# Patient Record
(demographics unavailable — no encounter records)

---

## 2024-11-01 NOTE — ASSESSMENT
[FreeTextEntry1] : Regarding the reconstruction after skin cancer  surgery, we discussed scarring, risk of repeat procedure, poor wound healing, need for additional revisionary surgery,asymmetry, dissatisfaction with the outcome, and unplanned surgery in the future.  All questions were answered.  All risks were well understood by the patient.  Regarding the reconstruction after Mohs surgery, we discussed scarring, risk of repeat procedure, poor wound healing, need for additional revisionary surgery,asymmetry, dissatisfaction with the outcome, and unplanned surgery in the future.  All questions were answered.  All risks were well understood by the patient.  may need 1 or two stages  Photos taken with patient permission.  miguelina Caruso  on xarelto for pacemaker/defribrillator has held xarelto in past for colonoscopy--will do same for this proceure as long as [facility]/cards tteam

## 2024-11-01 NOTE — HISTORY OF PRESENT ILLNESS
[FreeTextEntry1] : 71 yr old male  here for preop Mohs visit to right inferior eyelid--bx by Peter--BCC 9/2024  prior left nasal BCC cared for by [facility] approx 1-2 yrs prior  nonsmoker nondiabetic

## 2024-11-01 NOTE — PHYSICAL EXAM
[NI] : Normal [de-identified] : right lower eyelid approx 3mm below ciliary margin well healing bx site  3mm x 9mm   diminished lower lid tone B/L [de-identified] : as above

## 2024-12-18 NOTE — HISTORY OF PRESENT ILLNESS
[FreeTextEntry1] : 71 yr old male, here for preop Mohs visit to right inferior eyelid--bx by Peter--BCC 9/2024. Prior left nasal BCC cared for by [facility] approx 1-2 yrs prior  nonsmoker nondiabetic  Interval hx (12/18/24): Pt POD#7 s/p R lower eyelid reconstruction. Patient has no complaints at this time. Wants to know when he can get back to exercising. Denies fever, N/V or chills

## 2024-12-18 NOTE — ASSESSMENT
[FreeTextEntry1] : Regarding the reconstruction after skin cancer  surgery, we discussed scarring, risk of repeat procedure, poor wound healing, need for additional revisionary surgery,asymmetry, dissatisfaction with the outcome, and unplanned surgery in the future.  All questions were answered.  All risks were well understood by the patient.  Regarding the reconstruction after Mohs surgery, we discussed scarring, risk of repeat procedure, poor wound healing, need for additional revisionary surgery,asymmetry, dissatisfaction with the outcome, and unplanned surgery in the future.  All questions were answered.  All risks were well understood by the patient.  may need 1 or two stages  POD#7 s/p R lower eyelid reconstruction.  - removed sutures, applied steri strip  - once steri strip falls off, can apply bacitracin - Can start light exercise but NO swimming - no submerging face in water - f/u 1 week

## 2024-12-18 NOTE — PHYSICAL EXAM
[NI] : Normal [de-identified] : right lower eyelid incision healing well with prolenes in place, no drainage, no swelling, no erythema [de-identified] : as above

## 2024-12-26 NOTE — ASSESSMENT
[FreeTextEntry1] : Regarding the reconstruction after skin cancer  surgery, we discussed scarring, risk of repeat procedure, poor wound healing, need for additional revisionary surgery,asymmetry, dissatisfaction with the outcome, and unplanned surgery in the future.  All questions were answered.  All risks were well understood by the patient.  Regarding the reconstruction after Mohs surgery, we discussed scarring, risk of repeat procedure, poor wound healing, need for additional revisionary surgery,asymmetry, dissatisfaction with the outcome, and unplanned surgery in the future.  All questions were answered.  All risks were well understood by the patient.  may need 1 or two stages  Photos taken with patient permission.  miguelina Caruso  on xarelto for pacemaker/defribrillator has held xarelto in past for colonoscopy--will do same for this proceure as long as ok w medical/cards tteam  12/26/2024 s/p RL eyelid wound lcosure w LTR 12/11/2024 doing fine no issue  w right eye  pt very happy no keratoconjunctivitis  Photos taken with patient permission. no PRS issues  f/u 3 months

## 2024-12-26 NOTE — PHYSICAL EXAM
[NI] : Normal [de-identified] : right lower eyelid approx 3mm below ciliary margin well healing bx site  3mm x 9mm   diminished lower lid tone B/L [de-identified] : as above

## 2025-04-03 NOTE — PHYSICAL EXAM
[NI] : Normal [de-identified] : right lower eyelid approx 3mm below ciliary margin well healing bx site  3mm x 9mm   diminished lower lid tone B/L [de-identified] : as above

## 2025-04-03 NOTE — ASSESSMENT
[FreeTextEntry1] : Regarding the reconstruction after skin cancer  surgery, we discussed scarring, risk of repeat procedure, poor wound healing, need for additional revisionary surgery,asymmetry, dissatisfaction with the outcome, and unplanned surgery in the future.  All questions were answered.  All risks were well understood by the patient.  Regarding the reconstruction after Mohs surgery, we discussed scarring, risk of repeat procedure, poor wound healing, need for additional revisionary surgery,asymmetry, dissatisfaction with the outcome, and unplanned surgery in the future.  All questions were answered.  All risks were well understood by the patient.  may need 1 or two stages  Photos taken with patient permission.  miguelina w [facility]  on xarelto for pacemaker/defribrillator has held xarelto in past for colonoscopy--will do same for this proceure as long as ok w medical/cards tteam  12/26/2024 s/p RL eyelid wound lcosure w LTR 12/11/2024 doing fine no issue  w right eye  pt very happy no keratoconjunctivitis  Photos taken with patient permission. no PRS issues  f/u 3 months  4/3/2025 recent hospitalization at [facility] for syncope--was in saunas and steam baths (has pacemaker) spent 8 days in hospital  s/p Rt lower eyelid recon 12/2024 wound fine fine very happy a results, as am I no issues   f/u Dec 2025 pt very happy overall